# Patient Record
Sex: FEMALE | Race: WHITE
[De-identification: names, ages, dates, MRNs, and addresses within clinical notes are randomized per-mention and may not be internally consistent; named-entity substitution may affect disease eponyms.]

---

## 2021-04-23 ENCOUNTER — HOSPITAL ENCOUNTER (EMERGENCY)
Dept: HOSPITAL 35 - ER | Age: 31
Discharge: HOME | End: 2021-04-23
Payer: COMMERCIAL

## 2021-04-23 VITALS — WEIGHT: 139.99 LBS | BODY MASS INDEX: 22.5 KG/M2 | HEIGHT: 66 IN

## 2021-04-23 VITALS — DIASTOLIC BLOOD PRESSURE: 60 MMHG | SYSTOLIC BLOOD PRESSURE: 107 MMHG

## 2021-04-23 DIAGNOSIS — Z79.899: ICD-10-CM

## 2021-04-23 DIAGNOSIS — Z88.8: ICD-10-CM

## 2021-04-23 DIAGNOSIS — M25.512: Primary | ICD-10-CM

## 2021-05-26 ENCOUNTER — HOSPITAL ENCOUNTER (EMERGENCY)
Dept: HOSPITAL 35 - ER | Age: 31
Discharge: HOME | End: 2021-05-26
Payer: COMMERCIAL

## 2021-05-26 VITALS — DIASTOLIC BLOOD PRESSURE: 62 MMHG | SYSTOLIC BLOOD PRESSURE: 102 MMHG

## 2021-05-26 VITALS — HEIGHT: 66 IN | WEIGHT: 132.01 LBS | BODY MASS INDEX: 21.22 KG/M2

## 2021-05-26 DIAGNOSIS — Z88.5: ICD-10-CM

## 2021-05-26 DIAGNOSIS — R63.0: ICD-10-CM

## 2021-05-26 DIAGNOSIS — R03.0: ICD-10-CM

## 2021-05-26 DIAGNOSIS — R00.2: Primary | ICD-10-CM

## 2021-05-26 LAB
ALBUMIN SERPL-MCNC: 4.6 G/DL (ref 3.4–5)
ALT SERPL-CCNC: 16 U/L (ref 14–59)
ANION GAP SERPL CALC-SCNC: 10 MMOL/L (ref 7–16)
AST SERPL-CCNC: 10 U/L (ref 15–37)
BASOPHILS NFR BLD AUTO: 0.3 % (ref 0–2)
BILIRUB SERPL-MCNC: 0.5 MG/DL (ref 0.2–1)
BILIRUB UR-MCNC: NEGATIVE MG/DL
BUN SERPL-MCNC: 9 MG/DL (ref 7–18)
CALCIUM SERPL-MCNC: 9.7 MG/DL (ref 8.5–10.1)
CHLORIDE SERPL-SCNC: 104 MMOL/L (ref 98–107)
CO2 SERPL-SCNC: 26 MMOL/L (ref 21–32)
COLOR UR: YELLOW
CREAT SERPL-MCNC: 0.9 MG/DL (ref 0.6–1)
EOSINOPHIL NFR BLD: 0.2 % (ref 0–3)
ERYTHROCYTE [DISTWIDTH] IN BLOOD BY AUTOMATED COUNT: 13 % (ref 10.5–14.5)
GLUCOSE SERPL-MCNC: 133 MG/DL (ref 74–106)
GRANULOCYTES NFR BLD MANUAL: 74.9 % (ref 36–66)
HCT VFR BLD CALC: 41.4 % (ref 37–47)
HGB BLD-MCNC: 13.9 GM/DL (ref 12–15)
KETONES UR STRIP-MCNC: NEGATIVE MG/DL
LYMPHOCYTES NFR BLD AUTO: 16.9 % (ref 24–44)
MCH RBC QN AUTO: 31.6 PG (ref 26–34)
MCHC RBC AUTO-ENTMCNC: 33.6 G/DL (ref 28–37)
MCV RBC: 94.1 FL (ref 80–100)
MONOCYTES NFR BLD: 7.7 % (ref 1–8)
MUCUS: (no result) STRN/LPF
NEUTROPHILS # BLD: 6.7 THOU/UL (ref 1.4–8.2)
PLATELET # BLD: 229 THOU/UL (ref 150–400)
POTASSIUM SERPL-SCNC: 3.7 MMOL/L (ref 3.5–5.1)
PROT SERPL-MCNC: 8.2 G/DL (ref 6.4–8.2)
RBC # BLD AUTO: 4.4 MIL/UL (ref 4.2–5)
RBC # UR STRIP: (no result) /UL
RBC #/AREA URNS HPF: (no result) /HPF
SODIUM SERPL-SCNC: 140 MMOL/L (ref 136–145)
SP GR UR STRIP: 1.01 (ref 1–1.03)
SQUAMOUS: (no result) /LPF (ref 0–3)
TROPONIN I SERPL-MCNC: <0.06 NG/ML (ref ?–0.06)
URINE CLARITY: CLEAR
URINE GLUCOSE-RANDOM*: NEGATIVE
URINE LEUKOCYTES-REFLEX: NEGATIVE
URINE NITRITE-REFLEX: NEGATIVE
URINE PROTEIN (DIPSTICK): NEGATIVE
URINE WBC-REFLEX: (no result) /HPF (ref 0–5)
UROBILINOGEN UR STRIP-ACNC: 0.2 E.U./DL (ref 0.2–1)
WBC # BLD AUTO: 8.9 THOU/UL (ref 4–11)

## 2021-05-26 NOTE — EKG
[address]  [address]
Phone:  [phone]                    ELECTROCARDIOGRAM REPORT      
_______________________________________________________________________________
 
Name:       FLORY TABARES         [id number]     Account #:      [id number]  
Admission:  21    Attend Phys:                          
Discharge:              Date of Birth:  [birth year]  
                                                          Report #: [id number]
   [id number]
_______________________________________________________________________________
                         [facility] ED
                                       
Test Date:    2021               Test Time:    10:39:19
Pat Name:     FLORY TABARES       Department:   
Patient ID:   [id number]            Room:          
Gender:       F                        Technician:   
:          1990               Requested By: Tato Farias
Order Number: [id number] MD:   Moris Park
                                 Measurements
Intervals                              Axis          
Rate:         114                      P:            73
DC:           153                      QRS:          43
QRSD:         117                      T:            -72
QT:           284                                    
QTc:          392                                    
                           Interpretive Statements
Sinus tachycardia
Biatrial enlargement
Nonspecific intraventricular conduction delay
Borderline repolarization abnormality
No previous ECG available for comparison
Electronically Signed On 2021 12:26:26 CDT by Moris Park
[email or web address]
 
 
 
 
 
 
 
 
 
 
 
 
 
 
 
 
 
 
 
 
  <ELECTRONICALLY SIGNED>
   By: Moris Park MD, [facility]    
  21     1226
D: 21 1039                           _____________________________________
T: 21 1039                           Moris Park MD, FACC      /EPI

## 2021-06-17 ENCOUNTER — HOSPITAL ENCOUNTER (EMERGENCY)
Dept: HOSPITAL 35 - ER | Age: 31
Discharge: HOME | End: 2021-06-17
Payer: COMMERCIAL

## 2021-06-17 VITALS — HEIGHT: 67 IN | WEIGHT: 130.01 LBS | BODY MASS INDEX: 20.4 KG/M2

## 2021-06-17 VITALS — DIASTOLIC BLOOD PRESSURE: 60 MMHG | SYSTOLIC BLOOD PRESSURE: 115 MMHG

## 2021-06-17 DIAGNOSIS — Z88.5: ICD-10-CM

## 2021-06-17 DIAGNOSIS — R00.0: ICD-10-CM

## 2021-06-17 DIAGNOSIS — R00.2: Primary | ICD-10-CM

## 2021-06-17 LAB
ALBUMIN SERPL-MCNC: 4.5 G/DL (ref 3.4–5)
ALT SERPL-CCNC: 20 U/L (ref 30–65)
ANION GAP SERPL CALC-SCNC: 9 MMOL/L (ref 7–16)
AST SERPL-CCNC: 13 U/L (ref 15–37)
BASOPHILS NFR BLD AUTO: 0.4 % (ref 0–2)
BILIRUB SERPL-MCNC: 0.4 MG/DL (ref 0.2–1)
BUN SERPL-MCNC: 14 MG/DL (ref 7–18)
CALCIUM SERPL-MCNC: 9.4 MG/DL (ref 8.5–10.1)
CHLORIDE SERPL-SCNC: 102 MMOL/L (ref 98–107)
CO2 SERPL-SCNC: 28 MMOL/L (ref 21–32)
CREAT SERPL-MCNC: 0.8 MG/DL (ref 0.6–1)
EOSINOPHIL NFR BLD: 0.2 % (ref 0–3)
ERYTHROCYTE [DISTWIDTH] IN BLOOD BY AUTOMATED COUNT: 12.8 % (ref 10.5–14.5)
GLUCOSE SERPL-MCNC: 95 MG/DL (ref 74–106)
GRANULOCYTES NFR BLD MANUAL: 70.9 % (ref 36–66)
HCT VFR BLD CALC: 42.2 % (ref 37–47)
HGB BLD-MCNC: 14.5 GM/DL (ref 12–15)
LYMPHOCYTES NFR BLD AUTO: 17.6 % (ref 24–44)
MCH RBC QN AUTO: 32.4 PG (ref 26–34)
MCHC RBC AUTO-ENTMCNC: 34.4 G/DL (ref 28–37)
MCV RBC: 94.3 FL (ref 80–100)
MONOCYTES NFR BLD: 10.9 % (ref 1–8)
NEUTROPHILS # BLD: 6.7 THOU/UL (ref 1.4–8.2)
PLATELET # BLD: 244 THOU/UL (ref 150–400)
POTASSIUM SERPL-SCNC: 3.8 MMOL/L (ref 3.5–5.1)
PROT SERPL-MCNC: 8.1 G/DL (ref 6.4–8.2)
RBC # BLD AUTO: 4.48 MIL/UL (ref 4.2–5)
SODIUM SERPL-SCNC: 139 MMOL/L (ref 136–145)
TROPONIN I SERPL-MCNC: <0.06 NG/ML (ref ?–0.06)
WBC # BLD AUTO: 9.4 THOU/UL (ref 4–11)

## 2021-06-18 ENCOUNTER — HOSPITAL ENCOUNTER (OUTPATIENT)
Dept: HOSPITAL 35 - SJCVCIMAG | Age: 31
End: 2021-06-18
Attending: INTERNAL MEDICINE
Payer: COMMERCIAL

## 2021-06-18 DIAGNOSIS — R20.2: ICD-10-CM

## 2021-06-18 DIAGNOSIS — I07.1: ICD-10-CM

## 2021-06-18 DIAGNOSIS — R00.0: ICD-10-CM

## 2021-06-18 DIAGNOSIS — R94.31: Primary | ICD-10-CM

## 2021-06-18 NOTE — EKG
[facility]
[employer]
[place]  [address]
Phone:  [phone]                    ELECTROCARDIOGRAM REPORT      
_______________________________________________________________________________
 
Name:       EMI TABARESODETTE THORNTON         Room #:                     DEP [facility]#:      [id number]     Account #:      [id number]  
Admission:  21    Attend Phys:                          
Discharge:  21    Date of Birth:  [birth year]  
                                                          Report #: [id number]
   [id number]
_______________________________________________________________________________
                         [facility] ED
                                       
Test Date:    2021               Test Time:    18:16:43
Pat Name:     FLORY TABARES       Department:   
Patient ID:   [id number]            Room:          
Gender:       F                        Technician:   NICHO
:          1990               Requested By: Tato Farias
Order Number: [id number] MD:   Moris Park
                                 Measurements
Intervals                              Axis          
Rate:         103                      P:            75
CO:           117                      QRS:          62
QRSD:         84                       T:            -67
QT:           327                                    
QTc:          428                                    
                           Interpretive Statements
Sinus tachycardia
LAE, consider biatrial enlargement
RSR' in V1 or V2, right VCD or RVH
Compared to ECG 2021 10:39:19
Right ventricular hypertrophy now present
RSR' in V1 or V2 now present
Intraventricular conduction delay no longer present
Electronically Signed On 2021 7:31:03 CDT by Moris Park
[email or web address]
 
 
 
 
 
 
 
 
 
 
 
 
 
 
 
 
 
 
  <ELECTRONICALLY SIGNED>
   By: Moris Park MD, FAC    
  21     0731
D: 21 181                           _____________________________________
T: 21                           Moris Park MD, [facility]      /EPI